# Patient Record
Sex: FEMALE
[De-identification: names, ages, dates, MRNs, and addresses within clinical notes are randomized per-mention and may not be internally consistent; named-entity substitution may affect disease eponyms.]

---

## 2017-03-02 NOTE — RAD
RIGHT HAND FOUR VIEWS

3/2/17

 

No fracture was seen. There is no significant arthritic change in the wrist. There is some minor noman
nt space narrowing in the DIP joints, particularly of the fifth digit.

 

IMPRESSION:  

No acute finding.

 

POS: HOME

## 2017-03-02 NOTE — RAD
RIGHT ELBOW FOUR VIEWS:

3/2/17

 

No fracture, dislocation, or joint effusion was seen. There are no particular arthritic changes. The
 bones appear normal for age. 

 

IMPRESSION:  

No significant bony findings.

 

POS: HOME

## 2017-07-07 NOTE — CT
CT OF THE BRAIN WITHOUT CONTRAST

7/7/17

 

A noncontrast CT was performed for evaluation of mental status changes in this patient who also has 
hypertension. No prior scans were available for comparison. 

 

The ventricles are normal in size with no shift. Mild atrophy is present for age but it is not remar
kable. There were no findings of acute stroke, mass or edema. There was a small 1.2 cm calcified pro
trusion from the inner table of the right frontal bone on scan 11. While this could be a focal area 
of hyperostosis, it could be a very small meningioma. The odds that this is of any significance at t
his point are quite low. While an MRI might be helpful, I would question if it is really needed. Oth
erwise, the scan shows truly no acute findings. Any ischemic changes are minimal for age. 

 

IMPRESSION:  

1.      No acute intracranial findings. 

2.      Small calcified protrusion from the inner table of the right frontal bone. Possibly a small 
meningioma, but of no real current concern. 

 

POS: HOME

## 2018-06-23 ENCOUNTER — HOSPITAL ENCOUNTER (INPATIENT)
Dept: HOSPITAL 57 - BURERS | Age: 83
LOS: 3 days | Discharge: NURSING, REHAB OR LONG-TERM CARE | DRG: 812 | End: 2018-06-26
Attending: FAMILY MEDICINE | Admitting: FAMILY MEDICINE
Payer: MEDICARE

## 2018-06-23 VITALS — BODY MASS INDEX: 19.8 KG/M2

## 2018-06-23 DIAGNOSIS — R41.0: ICD-10-CM

## 2018-06-23 DIAGNOSIS — K86.9: ICD-10-CM

## 2018-06-23 DIAGNOSIS — E86.0: ICD-10-CM

## 2018-06-23 DIAGNOSIS — E11.9: ICD-10-CM

## 2018-06-23 DIAGNOSIS — N39.0: ICD-10-CM

## 2018-06-23 DIAGNOSIS — I10: ICD-10-CM

## 2018-06-23 DIAGNOSIS — D64.9: Primary | ICD-10-CM

## 2018-06-23 DIAGNOSIS — E03.9: ICD-10-CM

## 2018-06-23 DIAGNOSIS — R18.8: ICD-10-CM

## 2018-06-23 DIAGNOSIS — R16.0: ICD-10-CM

## 2018-06-23 DIAGNOSIS — K52.9: ICD-10-CM

## 2018-06-23 DIAGNOSIS — K76.9: ICD-10-CM

## 2018-06-23 LAB
ALBUMIN SERPL BCG-MCNC: 3.7 G/DL (ref 3.4–4.8)
ALP SERPL-CCNC: 165 U/L (ref 40–150)
ALT SERPL W P-5'-P-CCNC: (no result) U/L (ref 8–55)
ANION GAP SERPL CALC-SCNC: 21 MMOL/L (ref 10–20)
AST SERPL-CCNC: 15 U/L (ref 5–34)
BASO STIPL BLD QL SMEAR: (no result) (100X)
BASOPHILS # BLD AUTO: 0 THOU/UL (ref 0–0.2)
BASOPHILS NFR BLD AUTO: 0.1 % (ref 0–1)
BILIRUB SERPL-MCNC: 1.6 MG/DL (ref 0.2–1.2)
BUN SERPL-MCNC: 40 MG/DL (ref 9.8–20.1)
CALCIUM SERPL-MCNC: 10.1 MG/DL (ref 7.8–10.44)
CHLORIDE SERPL-SCNC: 101 MMOL/L (ref 98–107)
CO2 SERPL-SCNC: 19 MMOL/L (ref 23–31)
COMM CRITICAL RESULTS DOC: (no result)
CREAT CL PREDICTED SERPL C-G-VRATE: 0 ML/MIN (ref 70–130)
EOSINOPHIL # BLD AUTO: 0 THOU/UL (ref 0–0.7)
EOSINOPHIL NFR BLD AUTO: 0 % (ref 0–10)
GLOBULIN SER CALC-MCNC: 3.8 G/DL (ref 2.4–3.5)
GLUCOSE SERPL-MCNC: 158 MG/DL (ref 83–110)
HGB BLD-MCNC: 8.2 G/DL (ref 12–16)
LIPASE SERPL-CCNC: 19 U/L (ref 8–78)
LYMPHOCYTES # BLD AUTO: 0.4 THOU/UL (ref 1.2–3.4)
LYMPHOCYTES NFR BLD AUTO: 3.6 % (ref 21–51)
MCH RBC QN AUTO: 22.9 PG (ref 27–31)
MCV RBC AUTO: 69.7 FL (ref 78–98)
MDIFF COMPLETE?: YES
MICROCYTES BLD QL SMEAR: (no result) (100X)
MONOCYTES # BLD AUTO: 0.4 THOU/UL (ref 0.11–0.59)
MONOCYTES NFR BLD AUTO: 4 % (ref 0–10)
NEUTROPHILS # BLD AUTO: 10 THOU/UL (ref 1.4–6.5)
NEUTROPHILS NFR BLD AUTO: 92.3 % (ref 42–75)
PLATELET # BLD AUTO: 156 THOU/UL (ref 130–400)
POTASSIUM SERPL-SCNC: 5.1 MMOL/L (ref 3.5–5.1)
RBC # BLD AUTO: 3.58 MILL/UL (ref 4.2–5.4)
SODIUM SERPL-SCNC: 136 MMOL/L (ref 136–145)
SP GR UR STRIP: 1.01 (ref 1–1.03)
WBC # BLD AUTO: 10.8 THOU/UL (ref 4.8–10.8)

## 2018-06-23 PROCEDURE — A4353 INTERMITTENT URINARY CATH: HCPCS

## 2018-06-23 PROCEDURE — 81001 URINALYSIS AUTO W/SCOPE: CPT

## 2018-06-23 PROCEDURE — 74022 RADEX COMPL AQT ABD SERIES: CPT

## 2018-06-23 PROCEDURE — 36415 COLL VENOUS BLD VENIPUNCTURE: CPT

## 2018-06-23 PROCEDURE — A4216 STERILE WATER/SALINE, 10 ML: HCPCS

## 2018-06-23 PROCEDURE — G0009 ADMIN PNEUMOCOCCAL VACCINE: HCPCS

## 2018-06-23 PROCEDURE — 82274 ASSAY TEST FOR BLOOD FECAL: CPT

## 2018-06-23 PROCEDURE — 90670 PCV13 VACCINE IM: CPT

## 2018-06-23 PROCEDURE — 87086 URINE CULTURE/COLONY COUNT: CPT

## 2018-06-23 PROCEDURE — 83540 ASSAY OF IRON: CPT

## 2018-06-23 PROCEDURE — 83550 IRON BINDING TEST: CPT

## 2018-06-23 PROCEDURE — 85025 COMPLETE CBC W/AUTO DIFF WBC: CPT

## 2018-06-23 PROCEDURE — 83605 ASSAY OF LACTIC ACID: CPT

## 2018-06-23 PROCEDURE — 81003 URINALYSIS AUTO W/O SCOPE: CPT

## 2018-06-23 PROCEDURE — 36416 COLLJ CAPILLARY BLOOD SPEC: CPT

## 2018-06-23 PROCEDURE — 90471 IMMUNIZATION ADMIN: CPT

## 2018-06-23 PROCEDURE — 74176 CT ABD & PELVIS W/O CONTRAST: CPT

## 2018-06-23 PROCEDURE — 83690 ASSAY OF LIPASE: CPT

## 2018-06-23 PROCEDURE — 80053 COMPREHEN METABOLIC PANEL: CPT

## 2018-06-23 RX ADMIN — Medication SCH: at 21:27

## 2018-06-23 NOTE — CT
CT ABDOMEN AND PELVIS WITHOUT CONTRAST:

6/23/18

 

Spiral CT of the abdomen and pelvis was done without oral or IV contrast for evaluation of abdominal 
pain and diarrhea. 

 

Oral contrast was given but IV contrast was withheld by request. Axial slices were acquired initially
, then coronal and sagittal reconstructions were done. 

 

The major finding on this study is a large mass in the pancreas measuring at least 6 cm in size. It i
s located near the junction of the body and tail. In addition, there are several masses seen in the l
iver, generally in the 3 cm size range with a small amount of surrounding fluid around the liver. The
 findings would suggest metastatic disease. The second part of the duodenum is somewhat thickened. Ad
ditionally, there is some question of mild thickening in the ascending colon/cecal area. 

 

Elsewhere, the lung bases are clear except for some dependent atelectasis. Some coronary artery calci
fications are seen. The spleen is normal in size but does have at least one 1.3 cm hypodense nodule w
ithin it.  There has been a prior cholecystectomy. There appear to be nodules in each adrenal gland t
hanane they are not easily measured without contrast. There is a 3.7 cm water density area adjacent to
 the right kidney. It is probably an exophytic renal cyst. A smaller similar structure is seen attach
ed to the mid to lower portion of this kidney. There is no sign of urinary tract obstruction. The abd
ominal aorta is densely calcified. The renal arteries are densely calcified at their origins. 

 

There is no sign of bowel obstruction. As mentioned above, there is some mild thickening of the secon
d part of the duodenum and perhaps the lower ascending colon and cecum. There are no dramatic inflamm
atory changes around bowel. There are probably some calcified fibroids in the uterus. There may be a 
trace of fluid in the cul-de-sac on the left side. 

 

 

IMPRESSION:  

Large pancreatic mass with multiple hepatic masses, ascites and a small nodule in the spleen. The mos
t probable diagnosis is pancreatic cancer with spread to adjacent organs, particularly the liver. 

 

Findings discussed with Dr. Quan at 1718 on 6/23/18.

 

POS: HOME

## 2018-06-24 LAB
ALBUMIN SERPL BCG-MCNC: 2.8 G/DL (ref 3.4–4.8)
ALP SERPL-CCNC: 131 U/L (ref 40–150)
ALT SERPL W P-5'-P-CCNC: (no result) U/L (ref 8–55)
ANION GAP SERPL CALC-SCNC: 13 MMOL/L (ref 10–20)
AST SERPL-CCNC: 11 U/L (ref 5–34)
BACTERIA UR QL AUTO: (no result) HPF
BASO STIPL BLD QL SMEAR: (no result) (100X)
BASOPHILS # BLD AUTO: 0 THOU/UL (ref 0–0.2)
BASOPHILS NFR BLD AUTO: 0.2 % (ref 0–1)
BILIRUB SERPL-MCNC: 0.9 MG/DL (ref 0.2–1.2)
BUN SERPL-MCNC: 25 MG/DL (ref 9.8–20.1)
CALCIUM SERPL-MCNC: 8.7 MG/DL (ref 7.8–10.44)
CHLORIDE SERPL-SCNC: 107 MMOL/L (ref 98–107)
CO2 SERPL-SCNC: 19 MMOL/L (ref 23–31)
CREAT CL PREDICTED SERPL C-G-VRATE: 36 ML/MIN (ref 70–130)
EOSINOPHIL # BLD AUTO: 0 THOU/UL (ref 0–0.7)
EOSINOPHIL NFR BLD AUTO: 0.4 % (ref 0–10)
GLOBULIN SER CALC-MCNC: 2.9 G/DL (ref 2.4–3.5)
GLUCOSE SERPL-MCNC: 132 MG/DL (ref 83–110)
HGB BLD-MCNC: 7.6 G/DL (ref 12–16)
LYMPHOCYTES # BLD AUTO: 0.3 THOU/UL (ref 1.2–3.4)
LYMPHOCYTES NFR BLD AUTO: 3.1 % (ref 21–51)
MCH RBC QN AUTO: 22.1 PG (ref 27–31)
MCV RBC AUTO: 68.5 FL (ref 78–98)
MDIFF COMPLETE?: YES
MICROCYTES BLD QL SMEAR: (no result) (100X)
MONOCYTES # BLD AUTO: 0.7 THOU/UL (ref 0.11–0.59)
MONOCYTES NFR BLD AUTO: 6.4 % (ref 0–10)
NEUTROPHILS # BLD AUTO: 9.3 THOU/UL (ref 1.4–6.5)
NEUTROPHILS NFR BLD AUTO: 89.8 % (ref 42–75)
PLATELET # BLD AUTO: 124 THOU/UL (ref 130–400)
POTASSIUM SERPL-SCNC: 3.9 MMOL/L (ref 3.5–5.1)
RBC # BLD AUTO: 3.43 MILL/UL (ref 4.2–5.4)
RBC UR QL AUTO: (no result) HPF (ref 0–3)
SODIUM SERPL-SCNC: 135 MMOL/L (ref 136–145)
SP GR UR STRIP: 1.01 (ref 1–1.03)
WBC # BLD AUTO: 10.4 THOU/UL (ref 4.8–10.8)
WBC UR QL AUTO: (no result) HPF (ref 0–3)

## 2018-06-24 RX ADMIN — Medication SCH: at 23:40

## 2018-06-24 RX ADMIN — Medication SCH: at 08:49

## 2018-06-24 RX ADMIN — INSULIN LISPRO PRN UNIT: 100 INJECTION, SOLUTION INTRAVENOUS; SUBCUTANEOUS at 17:56

## 2018-06-24 NOTE — HP
DATE OF ADMISSION:  06/23/2018

 

CHIEF COMPLAINT:  Diarrhea and weakness.

 

HISTORY OF PRESENT ILLNESS:  The patient is extremely pleasant 85-year-old  female, who
 reports a 1-2 day history of increased nausea, abdominal pain, and frequent loose stools.  The patie
nt became more weak, unsteady on her gait over the last 24 hours, which made her decide to come to [facility] emergency room.  In the emergency room, she was found to be dehydrated with an elevated BUN of 40. 
 In addition, the patient had a generalized weakness and found to be anemic with a hemoglobin of 8.0.
  She was admitted for suspected gastroenteritis.

 

PAST MEDICAL HISTORY:  Includes:

1.  Diabetes mellitus, diet controlled.

2.  History of gout.

3.  History of mild microcytic anemia.

4.  History of hypertension.

 

PAST SURGICAL HISTORY:  Includes a cholecystectomy.

 

CURRENT MEDICATIONS:  The patient is on Norvasc 10 mg daily, hydroxyzine 25 mg daily, levothyroxine 2
5 mcg daily and Diovan 320 mg daily.

 

ALLERGIES:  No known drug allergies.

 

SOCIAL HISTORY:  The patient has been fairly independent in activities of daily living.  She has mult
iple children who live in the area.  She has a sister who was recently placed in a nursing home as [facility] patient and family do report that she has had multiple siblings and other family members pass away 
in the last year including her .  She is .  No significant history of smoking, alcohol 
or social drug use.  The patient is a FULL CODE.

 

REVIEW OF SYSTEMS:  The patient reports no recent fevers or chills.  No emesis, but has had abdominal
 pain with her nausea with decreased appetite.  She does report a significant weight loss for the las
t 6 months.  Does not know exact amount of weight lost.  The patient denies any URI-like symptoms.  N
o recent visual changes.  No chest pain or shortness of breath.  No night sweats.  The patient denies
 any dysuria, hematuria or change in urinary frequency.  She reports loose stools, but no hematochezi
a or melena.  Abdominal pain has been diffuse.  The patient denies any lower extremity edema.  No sig
nificant back pain.  No lesions or rashes noted to the skin.  The patient denies depression.

 

PHYSICAL EXAMINATION:

VITAL SIGNS:  Showed temperature 97.5, blood pressure was 167/72, pulse was 72, respiratory rate was 
16, O2 sat was 100% on room air.

GENERAL:   female, thin appearing and in no obvious distress.

HEENT:  Atraumatic, normocephalic.  Extraocular movements are intact.  Pupils are equal, round, and r
eactive to light and accommodation.  Oropharynx, mucous membranes were slightly dry.

NECK:  Supple, no masses palpated.

CHEST:  Clear to auscultation bilaterally.

HEART:  Regular rate and rhythm.

ABDOMEN:  Bowel sounds were hyperactive all four quadrants.  There was diffuse tenderness to palpatio
n, worse in the epigastrium and right upper quadrant.

EXTREMITIES:  Showed no cyanosis, clubbing or edema.

 

LABORATORY DATA:  CBC:  White count 10,800 with an H&H of 8.2 and 25.0.  Chemistry panel was signific
ant for a BUN of 40, creatinine of 1.5, glucose of 158.  Initial lactic acid was elevated at 3.9.  Fo
llowup lactic acid was normal after IV hydration.  Total bilirubin was elevated at 1.5.  ALT and AST 
were normal, but alkaline phosphatase was 165.  Urinalysis showed trace ketones and small bilirubin. 
 There were no imaging studies done in the emergency room.

 

ASSESSMENT AND PLAN:

1.  Dehydration.  We will admit patient to the floor, start IV fluids, oral hydration as patient tole
rates and hopefully she will improve shortly and be stable enough in the next 48-72 hours, to be disc
harged to home.  If she continues to be weak, may consider physical therapy and occupational therapy.


2.  Gastroenteritis.  We will obtain studies including stool studies.  We will follow CBC and follow 
white count.  Hopefully, we will advance diet as tolerated.

3.  Anemia.  The patient has history of mild anemia, but this is more significant than previous.  We 
will follow her hemoglobin as we hydrate her.  She may be more significantly anemic once her volume s
tatus has improved.

4.  Elevated alkaline phosphatase with elevated total bilirubin.  The patient is status post cholecys
tectomy with her diffuse abdominal pain.  May consider an imaging study of abdomen and pelvis with a 
CAT scan.

 

ADDENDUM:  After the patient was initially evaluated and admitted to the hospital, she obtained a CT 
scan of the abdomen and pelvis.  CT scan showed a large pancreatic mass with suspected metastatic les
ions to the liver and some surrounding edema and ascites, most consistent with pancreatic cancer.  Th
e case was discussed via phone with Dr. Darío Montelongo, oncologist on call for [facility].  He recommended seeing if this patient would improve in the next couple days and then con
 evaluation by himself and consideration for biopsy of the pancreatic metastases to the liver abdelrahman albarran.  The case was discussed with family and all questions were answered at this time.  Again there wa
s no definitive diagnosis, but explained to the family that the presumptive diagnosis is pancreatic c
ancer with metastatic lesions.  This is discussed in general with the patient as well and at this shell
e she wants to see how she improves in the next couple days and then follow up on an outpatient basis
.

## 2018-06-24 NOTE — RAD
ACUTE ABDOMEN SERIES:

 

Date:  06/24/18 

 

Supine and erect films show no free air beneath the diaphragm. Gas and food material is seen in the s
tomach. Some oral contrast from the recent CT has worked its way through the GI tract to the rectum, 
indicating there is no obstruction. Gas is present in nondistended large and small bowel, in a nonspe
cific pattern. Clips are noted in the right upper quadrant from a prior operative procedure. Chest fi
lm in the series shows a normal sized heart and clear lungs. 

 

IMPRESSION: 

Nonspecific abdominal findings. No acute changes of concern. The current gas and small bowel is disor
ganized and does not resemble an obstruction at this time. 

 

 

POS: HOME

## 2018-06-25 LAB
ALBUMIN SERPL BCG-MCNC: 2.8 G/DL (ref 3.4–4.8)
ALP SERPL-CCNC: 130 U/L (ref 40–150)
ALT SERPL W P-5'-P-CCNC: (no result) U/L (ref 8–55)
ANION GAP SERPL CALC-SCNC: 13 MMOL/L (ref 10–20)
ANISOCYTOSIS BLD QL SMEAR: (no result) (100X)
AST SERPL-CCNC: 12 U/L (ref 5–34)
BASOPHILS # BLD AUTO: 0 THOU/UL (ref 0–0.2)
BASOPHILS NFR BLD AUTO: 0.3 % (ref 0–1)
BILIRUB SERPL-MCNC: 0.9 MG/DL (ref 0.2–1.2)
BUN SERPL-MCNC: 20 MG/DL (ref 9.8–20.1)
BURR CELLS BLD QL SMEAR: (no result) (100X)
CALCIUM SERPL-MCNC: 8.7 MG/DL (ref 7.8–10.44)
CHLORIDE SERPL-SCNC: 105 MMOL/L (ref 98–107)
CO2 SERPL-SCNC: 18 MMOL/L (ref 23–31)
CREAT CL PREDICTED SERPL C-G-VRATE: 32 ML/MIN (ref 70–130)
EOSINOPHIL # BLD AUTO: 0 THOU/UL (ref 0–0.7)
EOSINOPHIL NFR BLD AUTO: 0.1 % (ref 0–10)
GLOBULIN SER CALC-MCNC: 3 G/DL (ref 2.4–3.5)
GLUCOSE SERPL-MCNC: 156 MG/DL (ref 83–110)
HGB BLD-MCNC: 7.7 G/DL (ref 12–16)
LYMPHOCYTES # BLD AUTO: 0.3 THOU/UL (ref 1.2–3.4)
LYMPHOCYTES NFR BLD AUTO: 2.8 % (ref 21–51)
MCH RBC QN AUTO: 22.3 PG (ref 27–31)
MCV RBC AUTO: 68.6 FL (ref 78–98)
MDIFF COMPLETE?: YES
MICROCYTES BLD QL SMEAR: (no result) (100X)
MONOCYTES # BLD AUTO: 0.6 THOU/UL (ref 0.11–0.59)
MONOCYTES NFR BLD AUTO: 5.2 % (ref 0–10)
NEUTROPHILS # BLD AUTO: 11.1 THOU/UL (ref 1.4–6.5)
NEUTROPHILS NFR BLD AUTO: 91.7 % (ref 42–75)
PLATELET # BLD AUTO: 123 THOU/UL (ref 130–400)
PLATELET BLD QL SMEAR: (no result)
POIKILOCYTOSIS BLD QL SMEAR: (no result) (100X)
POTASSIUM SERPL-SCNC: 3.9 MMOL/L (ref 3.5–5.1)
RBC # BLD AUTO: 3.45 MILL/UL (ref 4.2–5.4)
SCHISTOCYTES BLD QL SMEAR: (no result) (100X)
SODIUM SERPL-SCNC: 132 MMOL/L (ref 136–145)
WBC # BLD AUTO: 12.1 THOU/UL (ref 4.8–10.8)

## 2018-06-25 RX ADMIN — INSULIN LISPRO PRN UNIT: 100 INJECTION, SOLUTION INTRAVENOUS; SUBCUTANEOUS at 10:27

## 2018-06-25 RX ADMIN — SULFAMETHOXAZOLE AND TRIMETHOPRIM SCH TAB: 800; 160 TABLET ORAL at 21:08

## 2018-06-25 RX ADMIN — Medication SCH: at 10:46

## 2018-06-25 RX ADMIN — INSULIN LISPRO PRN UNIT: 100 INJECTION, SOLUTION INTRAVENOUS; SUBCUTANEOUS at 13:31

## 2018-06-25 RX ADMIN — SULFAMETHOXAZOLE AND TRIMETHOPRIM SCH TAB: 800; 160 TABLET ORAL at 10:14

## 2018-06-25 RX ADMIN — Medication SCH ML: at 21:07

## 2018-06-26 ENCOUNTER — HOSPITAL ENCOUNTER (INPATIENT)
Dept: HOSPITAL 57 - BURMED | Age: 83
LOS: 6 days | Discharge: ANOTHER HOSPITAL | DRG: 948 | End: 2018-07-02
Attending: FAMILY MEDICINE | Admitting: FAMILY MEDICINE
Payer: MEDICARE

## 2018-06-26 VITALS — BODY MASS INDEX: 19.7 KG/M2

## 2018-06-26 VITALS — TEMPERATURE: 98.6 F | DIASTOLIC BLOOD PRESSURE: 65 MMHG | SYSTOLIC BLOOD PRESSURE: 138 MMHG

## 2018-06-26 DIAGNOSIS — I10: ICD-10-CM

## 2018-06-26 DIAGNOSIS — E11.9: ICD-10-CM

## 2018-06-26 DIAGNOSIS — C25.9: ICD-10-CM

## 2018-06-26 DIAGNOSIS — R53.1: Primary | ICD-10-CM

## 2018-06-26 DIAGNOSIS — M10.9: ICD-10-CM

## 2018-06-26 DIAGNOSIS — Z79.899: ICD-10-CM

## 2018-06-26 DIAGNOSIS — C78.7: ICD-10-CM

## 2018-06-26 LAB
ALBUMIN SERPL BCG-MCNC: 2.5 G/DL (ref 3.4–4.8)
ALP SERPL-CCNC: 121 U/L (ref 40–150)
ALT SERPL W P-5'-P-CCNC: (no result) U/L (ref 8–55)
ANION GAP SERPL CALC-SCNC: 12 MMOL/L (ref 10–20)
ANISOCYTOSIS BLD QL SMEAR: (no result) (100X)
AST SERPL-CCNC: 20 U/L (ref 5–34)
BASOPHILS # BLD AUTO: 0 THOU/UL (ref 0–0.2)
BASOPHILS NFR BLD AUTO: 0.3 % (ref 0–1)
BILIRUB SERPL-MCNC: 0.6 MG/DL (ref 0.2–1.2)
BUN SERPL-MCNC: 24 MG/DL (ref 9.8–20.1)
BURR CELLS BLD QL SMEAR: (no result) (100X)
CALCIUM SERPL-MCNC: 8.4 MG/DL (ref 7.8–10.44)
CHLORIDE SERPL-SCNC: 105 MMOL/L (ref 98–107)
CO2 SERPL-SCNC: 18 MMOL/L (ref 23–31)
CREAT CL PREDICTED SERPL C-G-VRATE: 24 ML/MIN (ref 70–130)
EOSINOPHIL # BLD AUTO: 0 THOU/UL (ref 0–0.7)
EOSINOPHIL NFR BLD AUTO: 0.4 % (ref 0–10)
GLOBULIN SER CALC-MCNC: 2.5 G/DL (ref 2.4–3.5)
GLUCOSE SERPL-MCNC: 125 MG/DL (ref 83–110)
HGB BLD-MCNC: 6.7 G/DL (ref 12–16)
IRON SERPL-MCNC: 29 UG/DL (ref 50–170)
LYMPHOCYTES # BLD AUTO: 0.4 THOU/UL (ref 1.2–3.4)
LYMPHOCYTES NFR BLD AUTO: 3.7 % (ref 21–51)
MCH RBC QN AUTO: 22.8 PG (ref 27–31)
MCV RBC AUTO: 68.7 FL (ref 78–98)
MDIFF COMPLETE?: YES
MICROCYTES BLD QL SMEAR: (no result) (100X)
MONOCYTES # BLD AUTO: 0.7 THOU/UL (ref 0.11–0.59)
MONOCYTES NFR BLD AUTO: 6.7 % (ref 0–10)
NEUTROPHILS # BLD AUTO: 9.3 THOU/UL (ref 1.4–6.5)
NEUTROPHILS NFR BLD AUTO: 88.9 % (ref 42–75)
PLATELET # BLD AUTO: 101 THOU/UL (ref 130–400)
PLATELET BLD QL SMEAR: (no result)
POIKILOCYTOSIS BLD QL SMEAR: (no result) (100X)
POTASSIUM SERPL-SCNC: 4.2 MMOL/L (ref 3.5–5.1)
RBC # BLD AUTO: 2.92 MILL/UL (ref 4.2–5.4)
SCHISTOCYTES BLD QL SMEAR: (no result) (100X)
SODIUM SERPL-SCNC: 131 MMOL/L (ref 136–145)
UIBC SERPL-MCNC: 146 MCG/DL (ref 265–497)
WBC # BLD AUTO: 10.4 THOU/UL (ref 4.8–10.8)

## 2018-06-26 PROCEDURE — 36415 COLL VENOUS BLD VENIPUNCTURE: CPT

## 2018-06-26 PROCEDURE — 87899 AGENT NOS ASSAY W/OPTIC: CPT

## 2018-06-26 PROCEDURE — 86850 RBC ANTIBODY SCREEN: CPT

## 2018-06-26 PROCEDURE — 86922 COMPATIBILITY TEST ANTIGLOB: CPT

## 2018-06-26 PROCEDURE — P9016 RBC LEUKOCYTES REDUCED: HCPCS

## 2018-06-26 PROCEDURE — 80053 COMPREHEN METABOLIC PANEL: CPT

## 2018-06-26 PROCEDURE — 85025 COMPLETE CBC W/AUTO DIFF WBC: CPT

## 2018-06-26 PROCEDURE — 70450 CT HEAD/BRAIN W/O DYE: CPT

## 2018-06-26 PROCEDURE — 36430 TRANSFUSION BLD/BLD COMPNT: CPT

## 2018-06-26 PROCEDURE — 86900 BLOOD TYPING SEROLOGIC ABO: CPT

## 2018-06-26 PROCEDURE — A4216 STERILE WATER/SALINE, 10 ML: HCPCS

## 2018-06-26 PROCEDURE — 87086 URINE CULTURE/COLONY COUNT: CPT

## 2018-06-26 PROCEDURE — 36416 COLLJ CAPILLARY BLOOD SPEC: CPT

## 2018-06-26 PROCEDURE — 85046 RETICYTE/HGB CONCENTRATE: CPT

## 2018-06-26 PROCEDURE — 81001 URINALYSIS AUTO W/SCOPE: CPT

## 2018-06-26 PROCEDURE — 86901 BLOOD TYPING SEROLOGIC RH(D): CPT

## 2018-06-26 PROCEDURE — 87449 NOS EACH ORGANISM AG IA: CPT

## 2018-06-26 RX ADMIN — SULFAMETHOXAZOLE AND TRIMETHOPRIM SCH TAB: 800; 160 TABLET ORAL at 21:24

## 2018-06-26 RX ADMIN — INSULIN LISPRO PRN UNIT: 100 INJECTION, SOLUTION INTRAVENOUS; SUBCUTANEOUS at 18:07

## 2018-06-26 RX ADMIN — Medication SCH ML: at 21:24

## 2018-06-26 RX ADMIN — SULFAMETHOXAZOLE AND TRIMETHOPRIM SCH TAB: 800; 160 TABLET ORAL at 08:29

## 2018-06-26 RX ADMIN — Medication SCH ML: at 08:27

## 2018-06-27 LAB
ALBUMIN SERPL BCG-MCNC: 2.5 G/DL (ref 3.4–4.8)
ALP SERPL-CCNC: 121 U/L (ref 40–150)
ALT SERPL W P-5'-P-CCNC: (no result) U/L (ref 8–55)
ANION GAP SERPL CALC-SCNC: 12 MMOL/L (ref 10–20)
AST SERPL-CCNC: 21 U/L (ref 5–34)
BASO STIPL BLD QL SMEAR: (no result) (100X)
BASOPHILS # BLD AUTO: 0 THOU/UL (ref 0–0.2)
BASOPHILS NFR BLD AUTO: 0.4 % (ref 0–1)
BILIRUB SERPL-MCNC: 0.6 MG/DL (ref 0.2–1.2)
BUN SERPL-MCNC: 26 MG/DL (ref 9.8–20.1)
CALCIUM SERPL-MCNC: 8.4 MG/DL (ref 7.8–10.44)
CHLORIDE SERPL-SCNC: 106 MMOL/L (ref 98–107)
CO2 SERPL-SCNC: 17 MMOL/L (ref 23–31)
CREAT CL PREDICTED SERPL C-G-VRATE: 21 ML/MIN (ref 70–130)
EOSINOPHIL # BLD AUTO: 0 THOU/UL (ref 0–0.7)
EOSINOPHIL NFR BLD AUTO: 0.2 % (ref 0–10)
GLOBULIN SER CALC-MCNC: 2.5 G/DL (ref 2.4–3.5)
GLUCOSE SERPL-MCNC: 93 MG/DL (ref 83–110)
HGB BLD-MCNC: 6.7 G/DL (ref 12–16)
LYMPHOCYTES # BLD AUTO: 0.4 THOU/UL (ref 1.2–3.4)
LYMPHOCYTES NFR BLD AUTO: 3.4 % (ref 21–51)
MACROCYTES BLD QL SMEAR: (no result) (100X)
MCH RBC QN AUTO: 22.6 PG (ref 27–31)
MCV RBC AUTO: 68.9 FL (ref 78–98)
MDIFF COMPLETE?: YES
MICROCYTES BLD QL SMEAR: (no result) (100X)
MONOCYTES # BLD AUTO: 0.7 THOU/UL (ref 0.11–0.59)
MONOCYTES NFR BLD AUTO: 6 % (ref 0–10)
NEUTROPHILS # BLD AUTO: 10.1 THOU/UL (ref 1.4–6.5)
NEUTROPHILS NFR BLD AUTO: 89.9 % (ref 42–75)
PLATELET # BLD AUTO: 97 THOU/UL (ref 130–400)
POTASSIUM SERPL-SCNC: 4.3 MMOL/L (ref 3.5–5.1)
RBC # BLD AUTO: 2.96 MILL/UL (ref 4.2–5.4)
SODIUM SERPL-SCNC: 131 MMOL/L (ref 136–145)
WBC # BLD AUTO: 11.3 THOU/UL (ref 4.8–10.8)

## 2018-06-27 RX ADMIN — SULFAMETHOXAZOLE AND TRIMETHOPRIM SCH TAB: 800; 160 TABLET ORAL at 08:40

## 2018-06-27 RX ADMIN — INSULIN LISPRO PRN UNIT: 100 INJECTION, SOLUTION INTRAVENOUS; SUBCUTANEOUS at 17:36

## 2018-06-27 RX ADMIN — Medication SCH: at 20:08

## 2018-06-27 RX ADMIN — Medication SCH ML: at 08:37

## 2018-06-27 NOTE — CT
CT OF THE BRAIN WITHOUT CONTRAST:

6/27/18

 

Comparison is made with the 7/7/17 study. 

 

There is no adverse interval change. The ventricles are normal in size for age and atrophy. While the
re is evidence of some mild chronic ischemic changes, there were no findings of acute stroke, mass, o
r edema. Atrophy is present as before. Also noted as previously, is a 1.2 cm protuberance from the in
ner table of the right frontal bone that could be a small osteoma other benign finding. This has not 
changed over time. The sphenoid sinus and mastoid air cells are clear.

 

IMPRESSION:  

Chronic changes but no acute findings.

 

Preliminary report taken to nurses station at approximately 1530 on 6/27/18.

 

POS: HOME

## 2018-06-28 LAB
ALBUMIN SERPL BCG-MCNC: 2.7 G/DL (ref 3.4–4.8)
ALP SERPL-CCNC: 128 U/L (ref 40–150)
ALT SERPL W P-5'-P-CCNC: 8 U/L (ref 8–55)
ANION GAP SERPL CALC-SCNC: 15 MMOL/L (ref 10–20)
ANISOCYTOSIS BLD QL SMEAR: (no result) (100X)
AST SERPL-CCNC: 25 U/L (ref 5–34)
BASOPHILS # BLD AUTO: 0 THOU/UL (ref 0–0.2)
BASOPHILS NFR BLD AUTO: 0.3 % (ref 0–1)
BILIRUB SERPL-MCNC: 1.2 MG/DL (ref 0.2–1.2)
BUN SERPL-MCNC: 29 MG/DL (ref 9.8–20.1)
BURR CELLS BLD QL SMEAR: (no result) (100X)
CALCIUM SERPL-MCNC: 8.5 MG/DL (ref 7.8–10.44)
CHLORIDE SERPL-SCNC: 105 MMOL/L (ref 98–107)
CO2 SERPL-SCNC: 16 MMOL/L (ref 23–31)
CREAT CL PREDICTED SERPL C-G-VRATE: 18 ML/MIN (ref 70–130)
EOSINOPHIL # BLD AUTO: 0.1 THOU/UL (ref 0–0.7)
EOSINOPHIL NFR BLD AUTO: 0.4 % (ref 0–10)
GLOBULIN SER CALC-MCNC: 2.8 G/DL (ref 2.4–3.5)
GLUCOSE SERPL-MCNC: 88 MG/DL (ref 83–110)
HGB BLD-MCNC: 8.5 G/DL (ref 12–16)
LYMPHOCYTES # BLD AUTO: 0.4 THOU/UL (ref 1.2–3.4)
LYMPHOCYTES NFR BLD AUTO: 2.7 % (ref 21–51)
MCH RBC QN AUTO: 24.1 PG (ref 27–31)
MCV RBC AUTO: 71.8 FL (ref 78–98)
MDIFF COMPLETE?: YES
MICROCYTES BLD QL SMEAR: (no result) (100X)
MONOCYTES # BLD AUTO: 0.9 THOU/UL (ref 0.11–0.59)
MONOCYTES NFR BLD AUTO: 6.5 % (ref 0–10)
NEUTROPHILS # BLD AUTO: 12.3 THOU/UL (ref 1.4–6.5)
NEUTROPHILS NFR BLD AUTO: 90 % (ref 42–75)
OVALOCYTES BLD QL SMEAR: (no result) (100X)
PLATELET # BLD AUTO: 72 THOU/UL (ref 130–400)
PLATELET BLD QL SMEAR: (no result)
POTASSIUM SERPL-SCNC: 4.5 MMOL/L (ref 3.5–5.1)
RBC # BLD AUTO: 3.52 MILL/UL (ref 4.2–5.4)
RETICS/RBC NFR: 3.3 % (ref 0.5–1.5)
SODIUM SERPL-SCNC: 131 MMOL/L (ref 136–145)
WBC # BLD AUTO: 13.7 THOU/UL (ref 4.8–10.8)

## 2018-06-28 RX ADMIN — Medication SCH ML: at 21:08

## 2018-06-28 RX ADMIN — INSULIN LISPRO PRN UNIT: 100 INJECTION, SOLUTION INTRAVENOUS; SUBCUTANEOUS at 13:08

## 2018-06-28 RX ADMIN — Medication SCH ML: at 08:38

## 2018-06-29 RX ADMIN — Medication SCH ML: at 09:30

## 2018-06-29 RX ADMIN — Medication SCH ML: at 20:38

## 2018-06-29 RX ADMIN — INSULIN LISPRO PRN UNIT: 100 INJECTION, SOLUTION INTRAVENOUS; SUBCUTANEOUS at 13:07

## 2018-06-30 RX ADMIN — Medication SCH ML: at 20:36

## 2018-06-30 RX ADMIN — INSULIN LISPRO PRN UNIT: 100 INJECTION, SOLUTION INTRAVENOUS; SUBCUTANEOUS at 12:59

## 2018-06-30 RX ADMIN — Medication SCH ML: at 09:03

## 2018-07-01 LAB
BACTERIA UR QL AUTO: (no result) HPF
HYALINE CASTS #/AREA URNS LPF: (no result) LPF
PROT UR STRIP.AUTO-MCNC: 30 MG/DL
RBC UR QL AUTO: (no result) HPF (ref 0–3)
SP GR UR STRIP: 1.01 (ref 1–1.03)
WBC UR QL AUTO: (no result) HPF (ref 0–3)
YEAST FLD HPF-#/AREA: (no result) HPF

## 2018-07-01 RX ADMIN — INSULIN LISPRO PRN UNIT: 100 INJECTION, SOLUTION INTRAVENOUS; SUBCUTANEOUS at 13:20

## 2018-07-01 RX ADMIN — Medication SCH: at 09:11

## 2018-07-01 RX ADMIN — Medication SCH: at 23:01

## 2018-07-02 ENCOUNTER — HOSPITAL ENCOUNTER (INPATIENT)
Dept: HOSPITAL 92 - ERS | Age: 83
LOS: 4 days | Discharge: HOSPICE | DRG: 64 | End: 2018-07-06
Attending: FAMILY MEDICINE | Admitting: FAMILY MEDICINE
Payer: MEDICARE

## 2018-07-02 VITALS — SYSTOLIC BLOOD PRESSURE: 152 MMHG | TEMPERATURE: 98.9 F | DIASTOLIC BLOOD PRESSURE: 67 MMHG

## 2018-07-02 VITALS — BODY MASS INDEX: 20.7 KG/M2

## 2018-07-02 DIAGNOSIS — Z79.82: ICD-10-CM

## 2018-07-02 DIAGNOSIS — I63.439: Primary | ICD-10-CM

## 2018-07-02 DIAGNOSIS — K72.90: ICD-10-CM

## 2018-07-02 DIAGNOSIS — Z66: ICD-10-CM

## 2018-07-02 DIAGNOSIS — I11.9: ICD-10-CM

## 2018-07-02 DIAGNOSIS — M10.9: ICD-10-CM

## 2018-07-02 DIAGNOSIS — G93.40: ICD-10-CM

## 2018-07-02 DIAGNOSIS — K86.81: ICD-10-CM

## 2018-07-02 DIAGNOSIS — C78.00: ICD-10-CM

## 2018-07-02 DIAGNOSIS — C25.9: ICD-10-CM

## 2018-07-02 DIAGNOSIS — C78.7: ICD-10-CM

## 2018-07-02 DIAGNOSIS — D68.9: ICD-10-CM

## 2018-07-02 DIAGNOSIS — D63.0: ICD-10-CM

## 2018-07-02 DIAGNOSIS — E86.0: ICD-10-CM

## 2018-07-02 DIAGNOSIS — R29.810: ICD-10-CM

## 2018-07-02 DIAGNOSIS — N39.0: ICD-10-CM

## 2018-07-02 DIAGNOSIS — E87.1: ICD-10-CM

## 2018-07-02 DIAGNOSIS — I24.8: ICD-10-CM

## 2018-07-02 DIAGNOSIS — E87.5: ICD-10-CM

## 2018-07-02 DIAGNOSIS — G81.91: ICD-10-CM

## 2018-07-02 LAB
ALBUMIN SERPL BCG-MCNC: 2.9 G/DL (ref 3.4–4.8)
ALP SERPL-CCNC: 188 U/L (ref 40–150)
ALT SERPL W P-5'-P-CCNC: 12 U/L (ref 8–55)
ANION GAP SERPL CALC-SCNC: 12 MMOL/L (ref 10–20)
APTT PPP: 35.8 SEC (ref 22.9–36.1)
AST SERPL-CCNC: 32 U/L (ref 5–34)
BACTERIA UR QL AUTO: (no result) HPF
BASOPHILS # BLD AUTO: 0 THOU/UL (ref 0–0.2)
BASOPHILS NFR BLD AUTO: 0 % (ref 0–1)
BILIRUB SERPL-MCNC: 0.6 MG/DL (ref 0.2–1.2)
BUN SERPL-MCNC: 43 MG/DL (ref 9.8–20.1)
CALCIUM SERPL-MCNC: 8.8 MG/DL (ref 7.8–10.44)
CHLORIDE SERPL-SCNC: 98 MMOL/L (ref 98–107)
CK MB SERPL-MCNC: 1.4 NG/ML (ref 0–6.6)
CK SERPL-CCNC: 49 U/L (ref 29–168)
CO2 SERPL-SCNC: 20 MMOL/L (ref 23–31)
CREAT CL PREDICTED SERPL C-G-VRATE: 0 ML/MIN (ref 70–130)
CRYSTAL-AUWI FLAG: 1.3 (ref 0–15)
EOSINOPHIL # BLD AUTO: 0.1 THOU/UL (ref 0–0.7)
EOSINOPHIL NFR BLD AUTO: 0.8 % (ref 0–10)
GLOBULIN SER CALC-MCNC: 3.4 G/DL (ref 2.4–3.5)
GLUCOSE SERPL-MCNC: 138 MG/DL (ref 83–110)
HEV IGM SER QL: 0.1 (ref 0–7.99)
HGB BLD-MCNC: 9.3 G/DL (ref 12–16)
HYALINE CASTS #/AREA URNS LPF: (no result) LPF
INR PPP: 1.3
LYMPHOCYTES # BLD: 0.8 THOU/UL (ref 1.2–3.4)
LYMPHOCYTES NFR BLD AUTO: 6.7 % (ref 21–51)
MCH RBC QN AUTO: 25.7 PG (ref 27–31)
MCV RBC AUTO: 79.7 FL (ref 78–98)
MONOCYTES # BLD AUTO: 1.1 THOU/UL (ref 0.11–0.59)
MONOCYTES NFR BLD AUTO: 9 % (ref 0–10)
NEUTROPHILS # BLD AUTO: 10.2 THOU/UL (ref 1.4–6.5)
NEUTROPHILS NFR BLD AUTO: 83.6 % (ref 42–75)
PATHC CAST-AUWI FLAG: 2.03 (ref 0–2.49)
PLATELET # BLD AUTO: 100 THOU/UL (ref 130–400)
POTASSIUM SERPL-SCNC: 5.6 MMOL/L (ref 3.5–5.1)
PROTHROMBIN TIME: 16.5 SEC (ref 12–14.7)
RBC # BLD AUTO: 3.61 MILL/UL (ref 4.2–5.4)
RBC UR QL AUTO: (no result) HPF (ref 0–3)
SODIUM SERPL-SCNC: 124 MMOL/L (ref 136–145)
SP GR UR STRIP: 1.02 (ref 1–1.04)
SPERM-AUWI FLAG: 0 (ref 0–9.9)
TROPONIN I SERPL DL<=0.01 NG/ML-MCNC: 0.68 NG/ML (ref ?–0.03)
WBC # BLD AUTO: 12.2 THOU/UL (ref 4.8–10.8)
WBC UR QL AUTO: (no result) HPF (ref 0–3)
YEAST FLD HPF-#/AREA: (no result) HPF
YEAST-AUWI FLAG: 1121.1 (ref 0–25)

## 2018-07-02 PROCEDURE — 94760 N-INVAS EAR/PLS OXIMETRY 1: CPT

## 2018-07-02 PROCEDURE — 80053 COMPREHEN METABOLIC PANEL: CPT

## 2018-07-02 PROCEDURE — 93970 EXTREMITY STUDY: CPT

## 2018-07-02 PROCEDURE — 82947 ASSAY GLUCOSE BLOOD QUANT: CPT

## 2018-07-02 PROCEDURE — 36416 COLLJ CAPILLARY BLOOD SPEC: CPT

## 2018-07-02 PROCEDURE — 70450 CT HEAD/BRAIN W/O DYE: CPT

## 2018-07-02 PROCEDURE — 81015 MICROSCOPIC EXAM OF URINE: CPT

## 2018-07-02 PROCEDURE — 99292 CRITICAL CARE ADDL 30 MIN: CPT

## 2018-07-02 PROCEDURE — A4216 STERILE WATER/SALINE, 10 ML: HCPCS

## 2018-07-02 PROCEDURE — 85730 THROMBOPLASTIN TIME PARTIAL: CPT

## 2018-07-02 PROCEDURE — 70496 CT ANGIOGRAPHY HEAD: CPT

## 2018-07-02 PROCEDURE — 93010 ELECTROCARDIOGRAM REPORT: CPT

## 2018-07-02 PROCEDURE — 93005 ELECTROCARDIOGRAM TRACING: CPT

## 2018-07-02 PROCEDURE — 96365 THER/PROPH/DIAG IV INF INIT: CPT

## 2018-07-02 PROCEDURE — 87086 URINE CULTURE/COLONY COUNT: CPT

## 2018-07-02 PROCEDURE — 70498 CT ANGIOGRAPHY NECK: CPT

## 2018-07-02 PROCEDURE — 84484 ASSAY OF TROPONIN QUANT: CPT

## 2018-07-02 PROCEDURE — 70551 MRI BRAIN STEM W/O DYE: CPT

## 2018-07-02 PROCEDURE — 81003 URINALYSIS AUTO W/O SCOPE: CPT

## 2018-07-02 PROCEDURE — 82553 CREATINE MB FRACTION: CPT

## 2018-07-02 PROCEDURE — 80048 BASIC METABOLIC PNL TOTAL CA: CPT

## 2018-07-02 PROCEDURE — 82140 ASSAY OF AMMONIA: CPT

## 2018-07-02 PROCEDURE — 80061 LIPID PANEL: CPT

## 2018-07-02 PROCEDURE — 83735 ASSAY OF MAGNESIUM: CPT

## 2018-07-02 PROCEDURE — 85025 COMPLETE CBC W/AUTO DIFF WBC: CPT

## 2018-07-02 PROCEDURE — 85610 PROTHROMBIN TIME: CPT

## 2018-07-02 PROCEDURE — 36415 COLL VENOUS BLD VENIPUNCTURE: CPT

## 2018-07-02 PROCEDURE — 93306 TTE W/DOPPLER COMPLETE: CPT

## 2018-07-02 RX ADMIN — INSULIN LISPRO PRN UNIT: 100 INJECTION, SOLUTION INTRAVENOUS; SUBCUTANEOUS at 12:38

## 2018-07-02 RX ADMIN — Medication SCH: at 09:25

## 2018-07-02 NOTE — CT
CT BRAIN WITHOUT CONTRAST:

 

HISTORY:

Stroke alert.  Left-sided arm and leg deficits.  Bilateral facial droop.

 

COMPARISON:

CT brain from 06/27/2018.

 

FINDINGS:

No acute hemorrhage.  There is no loss of normal gray white matter differentiation.  Moderate microva
scular ischemic changes.

 

No midline shift or mass effect.  Right frontal calcified dural mass is unchanged.

 

The paranasal sinuses and mastoids are clear.

 

IMPRESSION:

1.  No acute hemorrhage.

 

2.  No acute large volume infarction.

 

 

 

CODE MICHAEL LUNA AT 5:28 P.M.

 

POS: [facility]

## 2018-07-02 NOTE — CT
CT ANGIOGRAM HEAD WITH CONTRAST:

CT ANGIOGRAM NECK WITH CONTRAST:

 

HISTORY:

Stroke activation.

 

COMPARISON:

None.

 

FINDINGS:

CT angiogram of the head and neck were performed after the intravenous administration of contrast, an
d 3D rendering was provided.

 

There are abnormal nodules within the left upper lobe, measuring up to 6 mm.

 

Multiple nodules are also present in the right upper lobe.

 

There are scoliotic changes of the cervical spine.  No compression fracture of the cervical spine.

 

VESSELS:  The transverse aorta is densely calcified.

 

RIGHT SIDE:  The common carotid artery is patent.  Dense calcification in the carotid bulb.  No hemod
ynamically significant stenosis using the NASCET criteria.

 

The right vertebral body origin is patent.  The vertebral arteries are co-dominant.

 

LEFT SIDE:  The common carotid artery origin is patent with approximately 20% narrowing due to calcif
ic plaque.  Using NASCET criteria, there is no significant stenosis of the left internal carotid yifan
ry.

 

The basilar artery is patent.  The posterior cerebral arteries are patent.  Although the petrous, cav
ernous, and supraclinoid ICAs are densely calcified, they are patent.  The anterior cerebral arteries
 and middle cerebral arteries are patent.

 

IMPRESSION:

1.  Mild narrowing of the bilateral internal carotid arteries, although no hemodynamically significan
t stenosis using NASCET criteria.  Stenosis is less than 30%.

 

2.  No acute intracranial thrombosis or significant stenosis.

 

3. Bilateral pulmonary nodules concerning for metastatic disease. 

 

CODE MICHAEL LUNA AT 5:57 P.M.

 

POS: [facility]

## 2018-07-03 LAB
ANION GAP SERPL CALC-SCNC: 13 MMOL/L (ref 10–20)
BASOPHILS # BLD AUTO: 0 THOU/UL (ref 0–0.2)
BASOPHILS NFR BLD AUTO: 0.2 % (ref 0–1)
BUN SERPL-MCNC: 38 MG/DL (ref 9.8–20.1)
CALCIUM SERPL-MCNC: 8.4 MG/DL (ref 7.8–10.44)
CHD RISK SERPL-RTO: 3.8 (ref ?–4.5)
CHLORIDE SERPL-SCNC: 101 MMOL/L (ref 98–107)
CHOLEST SERPL-MCNC: 169 MG/DL
CO2 SERPL-SCNC: 19 MMOL/L (ref 23–31)
CREAT CL PREDICTED SERPL C-G-VRATE: 31 ML/MIN (ref 70–130)
EOSINOPHIL # BLD AUTO: 0.1 THOU/UL (ref 0–0.7)
EOSINOPHIL NFR BLD AUTO: 0.9 % (ref 0–10)
GLUCOSE SERPL-MCNC: 109 MG/DL (ref 83–110)
HDLC SERPL-MCNC: 45 MG/DL
HGB BLD-MCNC: 8.5 G/DL (ref 12–16)
LDLC SERPL CALC-MCNC: 106 MG/DL
LYMPHOCYTES # BLD: 0.4 THOU/UL (ref 1.2–3.4)
LYMPHOCYTES NFR BLD AUTO: 3.5 % (ref 21–51)
MCH RBC QN AUTO: 25.5 PG (ref 27–31)
MCV RBC AUTO: 81.6 FL (ref 78–98)
MONOCYTES # BLD AUTO: 0.8 THOU/UL (ref 0.11–0.59)
MONOCYTES NFR BLD AUTO: 6.6 % (ref 0–10)
NEUTROPHILS # BLD AUTO: 10.7 THOU/UL (ref 1.4–6.5)
NEUTROPHILS NFR BLD AUTO: 88.9 % (ref 42–75)
PLATELET # BLD AUTO: 86 THOU/UL (ref 130–400)
POTASSIUM SERPL-SCNC: 4.8 MMOL/L (ref 3.5–5.1)
RBC # BLD AUTO: 3.33 MILL/UL (ref 4.2–5.4)
SODIUM SERPL-SCNC: 128 MMOL/L (ref 136–145)
TRIGL SERPL-MCNC: 92 MG/DL (ref ?–150)
TROPONIN I SERPL DL<=0.01 NG/ML-MCNC: 0.61 NG/ML (ref ?–0.03)
TROPONIN I SERPL DL<=0.01 NG/ML-MCNC: 0.62 NG/ML (ref ?–0.03)
WBC # BLD AUTO: 12 THOU/UL (ref 4.8–10.8)

## 2018-07-03 RX ADMIN — Medication SCH ML: at 21:00

## 2018-07-03 RX ADMIN — Medication SCH: at 08:04

## 2018-07-03 RX ADMIN — CEFTRIAXONE SCH MLS: 1 INJECTION, POWDER, FOR SOLUTION INTRAMUSCULAR; INTRAVENOUS at 17:07

## 2018-07-03 NOTE — PDOC.EVN
Event Note





- Event Note


Event Note: 





advanced care planning note





present: pt's dtr at pt bedside





dx: embolic stroke, metastatic ca unclear origin, elev troponin, metabolic 

encephalopathy, DVT, UTI





Summary: Reviewed the patient's diagnoses and possible treatment options with 

patient's family at bedside. AT this point in time the daughter (designated 

decision maker) would like to confer with family that is en route before making 

any decisions but would like to make the patient a DNR, DNI and will be 

discussing the possibility of hospice with extended family as above. 





Greater than 16 minutes spent discussing advanced care planning with family at 

bedside.

## 2018-07-03 NOTE — HP
CHIEF COMPLAINT:  Altered mental status.

 

HISTORY OF PRESENT ILLNESS:  This patient is an 85-year-old female, who was admitted on 2018 by
 Dr. Quan at [facility] in [place].  The patient initially presented with what looked like
 dehydration and gastroenteritis.  A CT scan was performed which showed a large pancreatic mass with 
suspected metastatic lesions to the liver with some surrounding edema and ascites consistent with a p
ancreatic cancer.  The patient was apparently hydrated and discharged to have a followup with Oncolog
y.  The patient was in Dr. Montelongo' office today.  The family reports that when she was in the lobby,
 she was completely normal, interacting, and joking with the family.  However, when she was undergoin
g her physical exam she suddenly had a change and became expressionless and stopped talking and appea
red to be just generally weak.  They called an ambulance and checked her blood sugar which was normal
 and subsequently the patient was brought to the emergency department.  Since that time, the patient 
has remained fairly lethargic, but she is otherwise somewhat improved.  The patient does not have ful
l recollection of the event, but states that she feels fine other than feeling very tired at the [facility]
nt.  She denies feeling weakness, but does feel like she has some numbness in her right hand.

 

REVIEW OF SYSTEMS:  To the degree it is attainable, is negative via an 11-system review.

 

PAST MEDICAL HISTORY:  Notable for the above mention.  Pancreatic mass consistent with metastatic can
cer of the pancreas with mets to the liver, she also has a history of diet controlled diabetes, gout,
 microcytic anemia, and hypertension.

 

PAST SURGICAL HISTORY:  Cholecystectomy.

 

FAMILY HISTORY:  Both parents had cancer.

 

SOCIAL HISTORY:  The patient's  recently  of pancreatic cancer.  She is otherwise a nonsmo
ker, nondrinker.

 

ALLERGIES:  None.

 

HOME MEDICATIONS:  Dulcolax 10 mg p.r.n., Tylenol p.r.n., Zofran p.r.n., Synthroid 25 mcg p.o. q. day
, Colace b.i.d., valsartan 320 mg q. day, hydrochlorothiazide 25 mg q. day, Norvasc 10 mg q. day.

 

PHYSICAL EXAMINATION:

VITAL SIGNS:  Temperature is 97.6, pulse 84, respirations 16, O2 sats 96% on room air, blood pressure
 is 184/75.

GENERAL APPEARANCE:  The patient is lying in bed.  She is awake.  She is alert, but appears to be nicanor
ewhat lethargic.  She is in no distress.

HEENT:  PERRL.  No OP lesions.

NECK:  Supple and symmetric without lymphadenopathy, JVD, or carotid bruits.

CARDIOVASCULAR:  Regular rate and rhythm without murmurs, gallops or rubs.

LUNGS:  Clear to auscultation bilaterally with no wheezes or rales.

ABDOMEN:  Soft, nontender, nondistended with positive bowel sounds.

EXTREMITIES:  Warm and dry.

NEUROLOGIC:  Again, the patient appears lethargic, but she is answering questions appropriately.  She
 is following commands appropriately.  Her cranial nerves appear to be generally intact, although she
 continues to make a puffing noise every time she takes a breath.  She has a normal sensation through
out.  Her strength exam reveals some loss of coordination with her right upper extremity.  When asked
 to squeeze my hand, she tends to have a recurring flex and relaxation rather than a consistent squee
ze like she does on the left side, almost in an asterixis type pattern.  This occurs in the right low
er extremity as well.  She definitely has some weakness on the right upper and lower extremity as wel
l.

 

LABORATORY DATA AND IMAGING:  White count is 12.2, hemoglobin 9.3, platelet count is 100.  INR is 1.3
, sodium 124, potassium 5.6, chloride is 98, CO2 is 20, BUN 43, creatinine 1.42, glucose 138, alkalin
e phosphatase 188, CK 49, troponin 0.684.  Albumin 2.9, AST 32, ALT 12, bilirubin 0.6.  Urinalysis sh
ows 21-50 white blood cells, negative nitrites, moderate leukocyte esterase, 3+ yeast, 1+ bacteria.  
Brain CT is unremarkable.  CT angiogram of the head and neck reveals a nonocclusive disease, but also
 reveals bilateral pulmonary nodules concerning for metastatic disease.

 

ASSESSMENT AND PLAN:

1.  Acute neurologic change with altered mental status.  The multiple things included in the differen
tial here.  They include a metastatic lesion to the brain, cerebrovascular accident, myocardial infar
ction, urinary tract infection, dehydration, adrenal insufficiency, hepatic encephalopathy.  We will 
evaluate all of these.

2.  Pancreatic cancer with apparent metastases to the liver and lungs.  The patient has extremely poo
r prognosis given these findings.  She was being evaluated for the first time by Oncology today when 
she had the onset of these symptoms.  This has not been fully evaluated or even confirmed on biopsy y
et.

3.  Possible MI with NSTEMI.  The patient's troponin level was high.  This could be related to the ev
ents that occurred earlier today.  I will go ahead and give her aspirin now.  Keep her on telemetry a
nd continue with serial troponins.  Given her overall prognosis and her renal function not sure that 
a catheterization would be indicated, but if the numbers remain elevated, we will consult Cardiology.


4.  Renal.  The patient has some evidence of persistent dehydration with elevated BUN and creatinine.
  She was just recently admitted with dehydration.  We will continue to hydrate.

5.  Possible urinary tract infection.  The patient has evidence on her UA and she does have an elevat
ed white count.  This alone could account for her altered mental status.  We will cover with Rocephin
 and follow up urine culture.

6.  Hyponatremia.  This may be related to the dehydration that given the fact that she has hyponatrem
ia with hyperkalemia and concerning for possible adrenal mets with adrenal insufficiency, although he
r blood pressure is actually high.  We will recheck and see how she has responded to IV hydration bef
ore intervening further.

7.  Hyperkalemia.  We will see if she responds to fluids.

8.  Liver metastases.  We will check ammonia level to evaluate for possible hepatic encephalopathy.

9.  Possible metastatic lesion to the brain, accounting for her symptoms above.  We will order an MRI
 to evaluate for the possibility.

10.  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  We will avoid further antic
oagulants other than the aspirin.

11.  Disposition:  The patient reports that her daughter would be her surrogate decision maker.  She 
has not really thought about code status or end of life issues.  She would like to give that some shell
e and discussed this with her family.  For now, we will keep her a FULL CODE.

## 2018-07-03 NOTE — DIS
DATE OF ADMISSION:  06/26/2018

 

DATE OF DISCHARGE:  07/02/2018

 

BRIEF SUMMARY OF HOSPITAL COURSE:  The patient is an extremely pleasant 85-year-old  female, 
who initially presented to the emergency room on 06/23/2018 with weakness, suspected gastritis.  She 
was admitted to the hospital and a CT scan of the abdomen and pelvis showed a pancreatic cancer with 
metastases to the liver.  She was transferred to skilled nursing due to her debilitation and weakness
, and while undergoing physical therapy, she also was found to be anemic requiring a transfusion of 1
 unit of packed red blood cells in a palliative fashion to help with her overall endurance.  The nathan
ent had some slight increase in her endurance and strength with physical and occupational therapy.  S
he was sent Dr. Darío Montelongo' office on 07/02/2018 for outpatient evaluation for her suspected pancr
eatic cancer.  While in Dr. Montelongo' office, the patient had acute altered mental status and was foun
d to be likely having an acute CVA, and thus was discharged from skilled nursing status and [facility].  Apparently, the patient was admitted to [facility] at that time due to acute CVA.

## 2018-07-03 NOTE — MRI
MRI BRAIN WITHOUT CONTRAST:

 

HISTORY: 

Left-sided arm and leg deficits, bilateral facial droop, right-sided weakness, acute altered mental s
tatus.  Metastatic pancreatic cancer.

 

FINDINGS: 

Correlation is made with the previous day's CT scan.

 

There are foci of restricted diffusion in the right posterior corona radiata, left occipital lobe, an
d the left cerebellar hemisphere.  No acute hemorrhage, midline shift, or abnormal extraaxial fluid c
ollections are seen. There are multiple foci of T2 prolongation in the periventricular white matter c
onsistent with chronic small-vessel ischemic disease.  The ventricular size is appropriate and basila
r cisterns are patent.  

 

IMPRESSION: 

Small acute bilateral lacunar infarctions suspicious for embolic phenomenon.

 

POS: [facility]

## 2018-07-03 NOTE — CON
DATE OF CONSULTATION:  07/03/2018

 

CONSULTING PHYSICIAN:  Hospitalist Service.

 

IMPRESSION:  Posterior circulation strokes, possibly related to a hypercoagulable state due to her ca
ncer.

 

PLAN:  Hospice Care.

 

HISTORY OF PRESENT ILLNESS:  Ms. Carrero is an 85-year-old  female, who was recently diagnose
d with metastatic pancreatic cancer.  She began acting differently on Monday, that she was brought in
 for evaluation.  MRI of the brain reveals 3 small areas of infarct, all involving the posterior cere
bral artery distribution.  Her CTA of the cerebral vessels was unremarkable other than fairly heavy c
alcium build up in the aorta.  She has been anemic lately and was also noted to have a urinary tract 
infection.  She was recently seen by Cardiology and had an echocardiogram showing an ejection fractio
n of 55%.  The family has elected to pursue palliative care.

 

PAST MEDICAL HISTORY:  Hypertension, diabetes.

 

ALLERGIES:  None.

 

SOCIAL HISTORY:  Unremarkable.

 

REVIEW OF SYSTEMS:  Not obtainable.

 

PHYSICAL EXAMINATION:

GENERAL:  She is a somewhat frail-appearing elderly lady, lying in bed, sleeping.

VITAL SIGNS:  Stable.  She is afebrile.

HEENT:  Cranium, normocephalic and atraumatic.

NECK:  No lymphadenopathy noted.

EXTREMITIES:  Multiple areas of ecchymosis.

NEUROLOGIC:  She is quite lethargic and sleeping soundly.  She moves all extremities equally well.  S
ensation seemed to be symmetric to touch.  No abnormal movements were seen.

 

MRI images were reviewed.

 

SUMMARY:  Given the overall clinical picture.  There is not much to be done.

## 2018-07-03 NOTE — PDOC.EVN
Event Note





- Event Note


Event Note: 





pt seen & examined


h&p reviewed - no new changes with additions as below


nsg notes rev


pt is somnolent, follows commands but does not verbalize readily with me


family at bedside incl dtr





a/p


embolic stroke seen on MRI


appreciate neuro c/s


continue to monitor


pending ECHO results





DVT


hold on A/C for now given co-morbidities incl likely oncologic process


apprec onc c/s





likely metastatic dz





aspiration risk 2/2 metabolic encephalopathy and stroke


see discussion above


currently NPO





UTI


continue with empiric antibtioics, IVF


pending culture results





elevated troponin


question of demand ischemia


apprec card c/s





d/w pt's family - they would like to discuss possibly hospice with other family 

members as they arrive

## 2018-07-03 NOTE — CON
DATE OF CONSULTATION:  2018

 

REASON FOR CONSULTATION:  Pancreatic cancer.

 

HISTORY OF PRESENT ILLNESS:  Ms. Carrero is an 85-year-old  female who 
was recently found to have a pancreatic mass on her CT scan.  She had 
metastatic disease to the lung and liver.  She saw Dr. Montelongo yesterday for 
followup.  She was expressionless and weak.  Accu-Chek was performed, which was 
normal.  911 was called and she was brought to the emergency room for a 
possible CVA.  She has a left facial droop and right-sided weakness.  She has 
had a decreased mental status.  She will respond with stimulation.

 

PAST MEDICAL HISTORY:

1.  Newly diagnosed pancreatic cancer.

2.  Diabetes.

3.  Gout.

4.  Anemia.

5.  Hypertension.

 

PAST SURGICAL HISTORY:  Cholecystectomy.

 

ALLERGIES:  No known drug allergies.

 

HOME MEDICATIONS:

1.  Amlodipine 10 mg daily.

2.  Dulcolax p.r.n.

3.  Humalog insulin per sliding scale.

4.  Hydrochlorothiazide 25 mg daily.

5.  Levothyroxine 25 mcg daily.

6.  Valsartan 320 mg daily.

 

FAMILY HISTORY:  Both parents had unknown cancer.

 

SOCIAL HISTORY:  .    from pancreatic cancer.  She has 5 
children.  No alcohol, tobacco, or illicit drug use.

 

REVIEW OF SYSTEMS:  Unable to obtain secondary to somnolence.

 

PHYSICAL EXAMINATION:

VITAL SIGNS:  Temperature is 98.8, pulse is 96, respiratory rate 20, BP is 166/
77.

GENERAL:  Ill-appearing female in no acute distress.

HEENT:  Normocephalic, atraumatic.  She has a left facial droop.

CARDIOVASCULAR:  Regular rate and rhythm.

LUNGS:  Clear.

ABDOMEN:  Soft.  Bowel sounds are positive.

EXTREMITIES:  No clubbing, cyanosis, or edema.

SKIN:  No rash.

HEMATOLOGIC:  No petechia or purpura.

NEUROLOGIC:  The patient has again facial droop with right-sided weakness.

 

PERTINENT LABORATORY AND X-RAYS:  Current WBCs are 12.2, hemoglobin 9.3, 
hematocrit 28.8, platelet count is 100,000, 84% neutrophils, 7% lymphocytes.  
PT 16.5, INR is 1.3, PTT 35.8.  Sodium is 128, potassium 4.8, chloride 101, CO2 
is 19, BUN is 38, creatinine 1.14, calcium is 8.4, ammonia 17.  Troponin is 
0.610.  Total bilirubin is 0.6, AST is 32, ALT is 12, alkaline phosphatase is 
188, serum total protein is 6.3, albumin 2.9, globulin 3.4.

 

ASSESSMENT:

1.  Metastatic pancreatic cancer.

2.  Acute cerebrovascular accident.

 

DISCUSSION:  The case was discussed in detail with Dr. Montelongo.  The patient's 
prognosis is very poor.  She likely has only weeks to live.  There is no plan 
for a tissue biopsy.  I will do a CA 19-9.  We do recommend a change in her 
code status to DNR and to direct goals of care towards comfort.  She may be a 
candidate for inpatient hospice.  We will discuss with the Palliative Care 
team.  We will follow her remotely.

 

Thank you for the consult.

 

SKYLA

## 2018-07-03 NOTE — ULT
BILATERAL LOWER EXTREMITY VENOUS DOPPLER WITH SPECTRAL ANALYSIS AND COLOR FLOW EVALUATION:

7/3/18

 

HISTORY: 

Bilateral lower extremity edema.

 

FINDINGS:  

Gray scale, color flow, doppler evaluation, with spectral analysis of the bilateral lower extremity v
enous structures is performed with 2D imaging. The bilateral lower extremity common femoral, superfic
ial, popliteal, posterior tibial, most proximal greater saphenous and profunda femoral veins are imag
ed. 

 

There is decreased lumen compressibility and echogenicity material seen within the bilateral lower ex
tremity deep venous structures extending from the posterior tibial veins to the common femoral veins 
with significantly diminished flow or absence of flow within these venous structures related to bilat
eral occlusive DVT. 

 

IMPRESSION:  

1.      Extensive occlusive bilateral lower extremity DVT involving the visualized bilateral lower ex
tremity deep venous structures extending from the posterior tibial veins to the common femoral veins 
bilaterally. 

2.      Above findings were discussed with Judy nurse in charge of the patient's hospital care on 7/
3/18 at 1556 hours. Nurse reported that Dr. Velazquez was notified of these findings and Dr. Velazquez was curr
ently in the patient's room at this time discussing findings with the patient. 

 

POS: MARTINA

## 2018-07-03 NOTE — CON
DATE OF CONSULTATION:  07/03/2018

 

HISTORY OF PRESENT ILLNESS:  The patient is a pleasant 85-year-old woman who 
presents for evaluation after suddenly losing consciousness.  The patient has a 
history of a left bundle branch block.  She underwent a cardiac evaluation in 
2017 including a stress test which revealed her to have normal left ventricular 
ejection fraction of 63% with no evidence of ischemia.  The patient was 
recently having difficulty with symptoms of nausea and became dehydrated.  She 
underwent a CT scan which revealed evidence of probable pancreatic carcinoma.  
The patient was seen her oncologist when she suddenly felt weak and lost 
consciousness and was taken to the emergency room for evaluation.  The patient 
at this time is somnolent.  She denied having any chest pain or palpitations.

 

PAST MEDICAL HISTORY:

1.  Probable pancreatic carcinoma.

2.  Hypertension.

3.  Diabetes mellitus.

4.  Thyroid disorders.

5.  Dyslipidemia.

 

PAST SURGICAL HISTORY:  Cholecystectomy.

 

FAMILY HISTORY:  There is a positive family history of a cerebral vascular 
disease.

 

ALLERGIES:  None.

 

MEDICATIONS ON ADMISSION:  Synthroid 25 mcg daily, valsartan 320 daily, 
hydrochlorothiazide 25 daily, Norvasc 10 daily.

 

REVIEW OF SYSTEMS:  Not obtainable.

 

PHYSICAL EXAMINATION:

GENERAL:  This is an ill-appearing woman who is somnolent with a blood pressure 
of 166/77.

NECK:  Showed no jugular venous distention.

LUNGS:  Clear to auscultation.

HEART:  Regular rate and rhythm, normal S1, S2 with a 2/6 systolic murmur.

ABDOMEN:  Distended.

EXTREMITIES:  Showed mild bilateral edema.

SKIN:  Warm and dry.

VASCULAR:  Radial pulses 2+.

 

LABORATORY:  Sodium 128, potassium 4.8, chloride 101, bicarbonate 19, BUN 38, 
creatinine is 1.14, glucose was 109.  Troponin was 0.61.  Her white blood cell 
count is 12.0, hemoglobin 8.5, hematocrit was 27.1, platelets are 86.  Her EKG 
reveals her to have normal sinus rhythm with voltage criteria for left 
ventricular hypertrophy, possible previous septal infarction.

 

IMPRESSION:

1.  Cerebrovascular accident.

2.  Elevated troponin level.

3.  Probable pancreatic carcinoma.

4.  Hypertension.

5.  Dyslipidemia.

6.  Diabetes mellitus.

 

This patient presented with a cerebrovascular accident.  From a cardiac 
standpoint, she has an elevated troponin level suggestive of demand ischemia.  
This is probably from the acute event and her markedly elevated blood pressure.
  We will check the patient's echocardiogram.  We will monitor the patient's 
blood pressure.  We will start the patient on blood pressure medication.  We 
will follow this patient with you through her hospitalization.

 

SKYLA

## 2018-07-04 LAB
GLUCOSE SERPL-MCNC: 107 MG/DL (ref 83–110)
GLUCOSE SERPL-MCNC: 82 MG/DL (ref 83–110)

## 2018-07-04 RX ADMIN — Medication SCH: at 21:11

## 2018-07-04 RX ADMIN — Medication SCH: at 08:56

## 2018-07-04 RX ADMIN — CEFTRIAXONE SCH MLS: 1 INJECTION, POWDER, FOR SOLUTION INTRAMUSCULAR; INTRAVENOUS at 18:30

## 2018-07-05 LAB
GLUCOSE SERPL-MCNC: 141 MG/DL (ref 83–110)
GLUCOSE SERPL-MCNC: 161 MG/DL (ref 83–110)
GLUCOSE SERPL-MCNC: 162 MG/DL (ref 83–110)

## 2018-07-05 RX ADMIN — Medication SCH: at 22:09

## 2018-07-05 RX ADMIN — Medication SCH: at 10:33

## 2018-07-05 RX ADMIN — CEFTRIAXONE SCH MLS: 1 INJECTION, POWDER, FOR SOLUTION INTRAMUSCULAR; INTRAVENOUS at 18:28

## 2018-07-05 NOTE — EKG
Test Reason : 

Blood Pressure : ***/*** mmHG

Vent. Rate : 084 BPM     Atrial Rate : 084 BPM

   P-R Int : 158 ms          QRS Dur : 080 ms

    QT Int : 356 ms       P-R-T Axes : 005 -17 130 degrees

   QTc Int : 420 ms

 

Normal sinus rhythm

Minimal voltage criteria for LVH, may be normal variant

T wave abnormality, consider lateral ischemia

Abnormal ECG

When compared with ECG of 02-JUL-2018 17:51, (Unconfirmed)

Minimal criteria for Septal infarct are no longer Present

Confirmed by DR. CHELI CALHOUN ([id number]) on 7/5/2018 7:37:29 AM

 

Referred By:  CLEMENTINA           Confirmed By:DR. CHELI CALHOUN

## 2018-07-05 NOTE — PDOC.PN
- Subjective


Encounter Start Date: 07/05/18


Encounter Start Time: 16:00


Subjective: nsg notes rev, marcelle ovn, no new c/o, lying in the hospital bed


-: arouses and answers yes/ no questions





- Objective


Resuscitation Status: 


 











Resuscitation Status           DNR:Do Not Resuscitate














Vital Signs & Weight: 


 Vital Signs (12 hours)











  Temp Pulse Resp BP Pulse Ox


 


 07/05/18 20:00  99.0 F  90  18  183/72 H  96


 


 07/05/18 18:37   93   161/71 H 


 


 07/05/18 15:56  98.3 F  90  18  168/74 H  95


 


 07/05/18 12:59  98.4 F  84  22 H  141/63 H  95








 Weight











Admit Weight                   121 lb 8 oz


 


Weight                         126 lb 6.4 oz














I&O: 


 











 07/04/18 07/05/18 07/06/18





 06:59 06:59 06:59


 


Intake Total 1940 1112 1629


 


Output Total 500 800 200


 


Balance 5671 631 8743











Result Diagrams: 


 07/03/18 06:00





 07/03/18 06:00


Additional Labs: 


 Accuchecks











  07/05/18 07/05/18 07/04/18





  10:30 04:34 22:17


 


POC Glucose  162 H  139 H  96














  07/04/18 07/04/18





  16:47 11:05


 


POC Glucose  87  102














Phys Exam





- Physical Examination


Constitutional: NAD


Respiratory: no wheezing, no rales, no rhonchi, clear to auscultation bilateral


limited anterior exam


Cardiovascular: RRR, no significant murmur, no rub


Gastrointestinal: soft, positive bowel sounds


Musculoskeletal: no edema





Dx/Plan





- Plan





embolic stroke seen on MRI


appreciate neuro c/s


continue to monitor


pending ECHO results


high aspiration risk - d/c oral medications and transition to IV/ topical 





DVT


hold on A/C for now given co-morbidities incl likely oncologic process


apprec onc c/s





likely metastatic dz





aspiration risk 2/2 metabolic encephalopathy and stroke


see discussion above


currently NPO





UTI


continue with empiric antibtioics, IVF


pending culture results





elevated troponin


question of demand ischemia


apprec card c/s





please see ACP coding. trying to determine disposition for end of life care











Review of Systems





- Medications/Allergies


Allergies/Adverse Reactions: 


 Allergies











Allergy/AdvReac Type Severity Reaction Status Date / Time


 


No Known Allergies Allergy   Verified 07/02/18 22:16











Medications: 


 Current Medications





Aspirin (Aspirin)  300 mg HI DAILY [facility]


Clonidine (Catapres-Tts-1 Patch)  0.1 mg TD Q7DAYS [facility]


Hydralazine HCl (Apresoline)  10 mg SLOW IVP Q6H PRN


   PRN Reason: SBP>160


   Last Admin: 07/05/18 23:48 Dose:  10 mg


Sodium Chloride (Normal Saline 0.9%)  1,000 mls @ 80 mls/hr IV .[id number] [facility]


   Last Admin: 07/06/18 04:52 Dose:  Not Given


Ceftriaxone Sodium 1 gm/ (Sodium Chloride)  100 mls @ 200 mls/hr IVPB Q24HR [facility]


   Last Admin: 07/05/18 18:28 Dose:  100 mls


Dextrose/Sodium Chloride (D5 1/2 Ns)  1,000 mls @ 75 mls/hr IV .[id number] [facility]


   Last Admin: 07/05/18 22:12 Dose:  1,000 mls


Morphine Sulfate (Morphine)  2 mg SLOW IVP Q4H PRN


   PRN Reason: Breakthrough Pain


   Last Admin: 07/05/18 22:02 Dose:  2 mg


Sodium Chloride (Flush - Normal Saline)  10 ml IVF Q12HR [facility]


   Last Admin: 07/05/18 22:09 Dose:  Not Given


Sodium Chloride (Flush - Normal Saline)  10 ml IVF PRN PRN


   PRN Reason: Saline Flush

## 2018-07-06 VITALS — SYSTOLIC BLOOD PRESSURE: 122 MMHG | DIASTOLIC BLOOD PRESSURE: 54 MMHG | TEMPERATURE: 97.6 F

## 2018-07-06 RX ADMIN — Medication SCH: at 09:34

## 2018-07-06 NOTE — DIS
DATE OF ADMISSION:  07/02/2018

 

DATE OF DISCHARGE:  07/06/2018

 

DISCHARGE DIAGNOSES:

1.  Metastatic pancreatic carcinoma.

2.  Acute embolic cerebrovascular accident involving the posterior cerebral artery distribution.

3.  Acute metabolic encephalopathy, multifactorial.

4.  Elevated troponin I secondary to demand ischemia.

5.  Dyslipidemia.

6.  Chronic normocytic anemia.

7.  Hyponatremia.

 

CONSULTATIONS:  Dr. Calhoun with Cardiology Service.  Dr. Montelongo with Medical Oncology Service.  Dr Sourav Waldron with Neurology Service.

 

PERTINENT LABORATORY DATA AND X-RAY FINDINGS:  Sodium ranged between 124-135, creatinine ranged betwe
en 1.14-1.42.  Estimated GFR ranged between 35-45.  Troponin I ranged between 0.610-0.684.  Serum amm
onia level 17.  Total cholesterol 169, triglycerides 92, HDL 45, .  CBC showed white blood kika
l count ranging between 12.0-12.2, hemoglobin ranged between 8.5-9.3.  Urine culture dated 07/02/2018
 showed no growth at 36 hours.  CT of the brain without contrast dated 07/02/2018 showed no acute int
racranial process.  CT angiogram of the head and neck dated 07/02/2018 showed mild narrowing of the b
ilateral internal carotid arteries without hemodynamically significant stenosis.  No intracranial thr
ombosis or significant stenosis.  Bilateral pulmonary nodules concerning for metastatic process.  MRI
 of the brain dated 07/03/2013 showed small acute bilateral lacunar infarctions in the posterior cere
bral artery distribution.  A 2D transthoracic echocardiogram dated 07/04/2018 showed ejection fractio
n of 65%-70%.  Mild aortic and tricuspid regurgitation.  No intracardiac thrombus.

 

HOSPITAL COURSE:  The patient was initially admitted after presenting with altered mentation, undergo
ing extensive evaluation with multiple neuroimaging studies.  The patient was noted with recent CT of
 the abdomen and pelvis 06/23/2018 showing large pancreatic mass with multiple hepatic lesions concer
lizeth for metastatic process with associated ascites.  The patient underwent a focused exam on potenti
al central neurologic process to explain encephalopathy after presentation initially.  The patient wa
s discovered with lacunar infarcts in the posterior cerebral circulation and evaluated by the Neurolo
gy Service.  Given patient's overall comorbid status and diagnosis of likely metastatic pancreatic pr
ocess, patient was not deemed an appropriate candidate for anticoagulation.  Due to patient's overall
 comorbidities and current diagnosis of likely pancreatic cancer, discussions were had with the famil
y regarding goals of care and transition to palliative measures.  Family and patient decided to pursu
e palliative care through hospice through with intent on returning home with hospice care.  The patie
nt was converted to DO NOT RESUSCITATE status after confirmation with the family and set up for hospi
ce through [facility].  I have examined the patient at the time of discharge and discussed followup in
structions.  The patient and family ready for discharge on 07/06/2018.

 

DISCHARGE MEDICATIONS:

1.  Tylenol 650 mg p.o. q.4 hours p.r.n.

2.  Norvasc 10 mg p.o. daily.

3.  Dulcolax 10 mg per rectum daily p.r.n.

4.  Colace 100 mg p.o. b.i.d.

5.  Levothyroxine 25 mcg p.o. daily.

6.  Zofran 4 mg p.o. q.6 hours p.r.n.

7.  Valsartan 320 mg p.o. daily.

 

FOLLOWUP:  The patient will follow up with [facility] after discharge home.

 

CONDITION ON DISCHARGE:  Guarded.

 

ACTIVITY:  Ad tami.

 

DIET:  Regular.

 

CODE STATUS:  DO NOT RESUSCITATE, DO NOT INTUBATE.

 

DISPOSITION:  Home with [facility], 07/06/2018.

 

Total time in preparing and coordinating discharge is 32 minutes.